# Patient Record
Sex: FEMALE | Race: WHITE | Employment: UNEMPLOYED | URBAN - NONMETROPOLITAN AREA
[De-identification: names, ages, dates, MRNs, and addresses within clinical notes are randomized per-mention and may not be internally consistent; named-entity substitution may affect disease eponyms.]

---

## 2017-08-03 ENCOUNTER — OFFICE VISIT (OUTPATIENT)
Dept: PRIMARY CARE CLINIC | Age: 27
End: 2017-08-03
Payer: MEDICAID

## 2017-08-03 VITALS
HEART RATE: 102 BPM | TEMPERATURE: 99.1 F | BODY MASS INDEX: 48.83 KG/M2 | OXYGEN SATURATION: 97 % | SYSTOLIC BLOOD PRESSURE: 136 MMHG | WEIGHT: 286 LBS | HEIGHT: 64 IN | DIASTOLIC BLOOD PRESSURE: 80 MMHG

## 2017-08-03 DIAGNOSIS — G95.0 SYRINX OF SPINAL CORD (HCC): ICD-10-CM

## 2017-08-03 DIAGNOSIS — G93.5 CHIARI I MALFORMATION (HCC): Primary | ICD-10-CM

## 2017-08-03 DIAGNOSIS — R29.898 RIGHT HAND WEAKNESS: ICD-10-CM

## 2017-08-03 PROCEDURE — 99203 OFFICE O/P NEW LOW 30 MIN: CPT | Performed by: NURSE PRACTITIONER

## 2017-08-03 RX ORDER — TIZANIDINE 4 MG/1
TABLET ORAL 3 TIMES DAILY
COMMUNITY
Start: 2017-07-25 | End: 2017-10-30 | Stop reason: DRUGHIGH

## 2017-08-03 RX ORDER — VENLAFAXINE HYDROCHLORIDE 150 MG/1
CAPSULE, EXTENDED RELEASE ORAL DAILY
COMMUNITY
Start: 2017-07-21 | End: 2017-08-29 | Stop reason: ALTCHOICE

## 2017-08-03 RX ORDER — MELOXICAM 15 MG/1
TABLET ORAL DAILY
COMMUNITY
Start: 2017-07-06 | End: 2017-09-28 | Stop reason: DRUGHIGH

## 2017-08-03 RX ORDER — HYDROCODONE BITARTRATE AND ACETAMINOPHEN 7.5; 325 MG/1; MG/1
1 TABLET ORAL EVERY 8 HOURS PRN
Qty: 30 TABLET | Refills: 0 | Status: SHIPPED | OUTPATIENT
Start: 2017-08-03 | End: 2017-08-29 | Stop reason: SDUPTHER

## 2017-08-03 RX ORDER — GABAPENTIN 300 MG/1
600 CAPSULE ORAL 3 TIMES DAILY
COMMUNITY
Start: 2017-07-05 | End: 2017-08-03 | Stop reason: SDUPTHER

## 2017-08-03 RX ORDER — GABAPENTIN 300 MG/1
600 CAPSULE ORAL 3 TIMES DAILY
Qty: 180 CAPSULE | Refills: 5 | Status: SHIPPED | OUTPATIENT
Start: 2017-08-03 | End: 2017-09-28 | Stop reason: DRUGHIGH

## 2017-08-03 ASSESSMENT — ENCOUNTER SYMPTOMS
COUGH: 0
VOICE CHANGE: 0
SINUS PRESSURE: 0
SHORTNESS OF BREATH: 0
EYE DISCHARGE: 0
EYE PAIN: 0
CHEST TIGHTNESS: 0
SORE THROAT: 0
DIARRHEA: 0
ABDOMINAL PAIN: 0
PHOTOPHOBIA: 0
VOMITING: 0
TROUBLE SWALLOWING: 0
WHEEZING: 0
BACK PAIN: 0
CONSTIPATION: 0
NAUSEA: 0
BLOOD IN STOOL: 0

## 2017-08-29 ENCOUNTER — OFFICE VISIT (OUTPATIENT)
Dept: PRIMARY CARE CLINIC | Age: 27
End: 2017-08-29
Payer: MEDICAID

## 2017-08-29 VITALS
TEMPERATURE: 97.2 F | OXYGEN SATURATION: 99 % | WEIGHT: 290 LBS | HEIGHT: 64 IN | SYSTOLIC BLOOD PRESSURE: 130 MMHG | HEART RATE: 98 BPM | BODY MASS INDEX: 49.51 KG/M2 | DIASTOLIC BLOOD PRESSURE: 84 MMHG

## 2017-08-29 DIAGNOSIS — G95.0 SYRINX OF SPINAL CORD (HCC): ICD-10-CM

## 2017-08-29 DIAGNOSIS — F32.9 REACTIVE DEPRESSION: ICD-10-CM

## 2017-08-29 DIAGNOSIS — G93.5 CHIARI I MALFORMATION (HCC): Primary | ICD-10-CM

## 2017-08-29 PROCEDURE — 99213 OFFICE O/P EST LOW 20 MIN: CPT | Performed by: NURSE PRACTITIONER

## 2017-08-29 RX ORDER — SERTRALINE HYDROCHLORIDE 100 MG/1
100 TABLET, FILM COATED ORAL DAILY
Qty: 30 TABLET | Refills: 11 | Status: ON HOLD | OUTPATIENT
Start: 2017-08-29 | End: 2018-08-06 | Stop reason: HOSPADM

## 2017-08-29 RX ORDER — GABAPENTIN 800 MG/1
800 TABLET ORAL 3 TIMES DAILY
Qty: 90 TABLET | Refills: 3 | Status: SHIPPED | OUTPATIENT
Start: 2017-08-29 | End: 2017-10-30 | Stop reason: SDUPTHER

## 2017-08-29 RX ORDER — HYDROCODONE BITARTRATE AND ACETAMINOPHEN 7.5; 325 MG/1; MG/1
1 TABLET ORAL EVERY 8 HOURS PRN
Qty: 30 TABLET | Refills: 0 | Status: SHIPPED | OUTPATIENT
Start: 2017-08-29 | End: 2017-09-28 | Stop reason: SDUPTHER

## 2017-08-29 ASSESSMENT — ENCOUNTER SYMPTOMS
TROUBLE SWALLOWING: 0
NAUSEA: 0
VOICE CHANGE: 0
DIARRHEA: 0
BLOOD IN STOOL: 0
COUGH: 0
WHEEZING: 0
ABDOMINAL PAIN: 0
EYE PAIN: 0
CHEST TIGHTNESS: 0
BACK PAIN: 0
SINUS PRESSURE: 0
SHORTNESS OF BREATH: 0
EYE DISCHARGE: 0
VOMITING: 0
SORE THROAT: 0
CONSTIPATION: 0
PHOTOPHOBIA: 0

## 2017-09-28 ENCOUNTER — OFFICE VISIT (OUTPATIENT)
Dept: PRIMARY CARE CLINIC | Age: 27
End: 2017-09-28
Payer: MEDICAID

## 2017-09-28 VITALS
BODY MASS INDEX: 49.64 KG/M2 | HEIGHT: 64 IN | SYSTOLIC BLOOD PRESSURE: 126 MMHG | TEMPERATURE: 98 F | OXYGEN SATURATION: 97 % | DIASTOLIC BLOOD PRESSURE: 82 MMHG | WEIGHT: 290.75 LBS | HEART RATE: 72 BPM

## 2017-09-28 DIAGNOSIS — G95.0 SYRINX OF SPINAL CORD (HCC): ICD-10-CM

## 2017-09-28 DIAGNOSIS — G93.5 CHIARI I MALFORMATION (HCC): Primary | ICD-10-CM

## 2017-09-28 DIAGNOSIS — G89.4 CHRONIC PAIN SYNDROME: ICD-10-CM

## 2017-09-28 PROCEDURE — 99213 OFFICE O/P EST LOW 20 MIN: CPT | Performed by: NURSE PRACTITIONER

## 2017-09-28 RX ORDER — DICLOFENAC SODIUM 75 MG/1
75 TABLET, DELAYED RELEASE ORAL 2 TIMES DAILY
Qty: 60 TABLET | Refills: 3 | Status: SHIPPED | OUTPATIENT
Start: 2017-09-28

## 2017-09-28 RX ORDER — HYDROCODONE BITARTRATE AND ACETAMINOPHEN 7.5; 325 MG/1; MG/1
1 TABLET ORAL EVERY 8 HOURS PRN
Qty: 30 TABLET | Refills: 0 | Status: SHIPPED | OUTPATIENT
Start: 2017-09-28 | End: 2017-10-30 | Stop reason: SDUPTHER

## 2017-09-28 ASSESSMENT — ENCOUNTER SYMPTOMS
ABDOMINAL PAIN: 0
NAUSEA: 0
VOMITING: 0
CONSTIPATION: 0
CHEST TIGHTNESS: 0
WHEEZING: 0
BLOOD IN STOOL: 0
COUGH: 0
EYE DISCHARGE: 0
BACK PAIN: 0
EYE PAIN: 0
SINUS PRESSURE: 0
PHOTOPHOBIA: 0
TROUBLE SWALLOWING: 0
SHORTNESS OF BREATH: 0
DIARRHEA: 0
SORE THROAT: 0
VOICE CHANGE: 0

## 2017-10-30 ENCOUNTER — OFFICE VISIT (OUTPATIENT)
Dept: PRIMARY CARE CLINIC | Age: 27
End: 2017-10-30
Payer: MEDICAID

## 2017-10-30 VITALS
DIASTOLIC BLOOD PRESSURE: 80 MMHG | HEIGHT: 64 IN | WEIGHT: 293 LBS | HEART RATE: 101 BPM | TEMPERATURE: 98.7 F | SYSTOLIC BLOOD PRESSURE: 138 MMHG | OXYGEN SATURATION: 98 % | BODY MASS INDEX: 50.02 KG/M2

## 2017-10-30 DIAGNOSIS — G93.5 CHIARI I MALFORMATION (HCC): Primary | ICD-10-CM

## 2017-10-30 DIAGNOSIS — G95.0 SYRINX OF SPINAL CORD (HCC): ICD-10-CM

## 2017-10-30 DIAGNOSIS — M62.838 MUSCLE SPASMS OF NECK: ICD-10-CM

## 2017-10-30 PROCEDURE — 99213 OFFICE O/P EST LOW 20 MIN: CPT | Performed by: NURSE PRACTITIONER

## 2017-10-30 RX ORDER — GABAPENTIN 800 MG/1
800 TABLET ORAL 3 TIMES DAILY
Qty: 90 TABLET | Refills: 3 | Status: SHIPPED | OUTPATIENT
Start: 2017-10-30 | End: 2017-12-29 | Stop reason: SDUPTHER

## 2017-10-30 RX ORDER — METHOCARBAMOL 500 MG/1
500 TABLET, FILM COATED ORAL 4 TIMES DAILY
Qty: 40 TABLET | Refills: 0 | Status: SHIPPED | OUTPATIENT
Start: 2017-10-30 | End: 2017-11-09

## 2017-10-30 RX ORDER — HYDROCODONE BITARTRATE AND ACETAMINOPHEN 7.5; 325 MG/1; MG/1
1 TABLET ORAL EVERY 8 HOURS PRN
Qty: 30 TABLET | Refills: 0 | Status: SHIPPED | OUTPATIENT
Start: 2017-10-30 | End: 2017-11-30 | Stop reason: SDUPTHER

## 2017-10-30 ASSESSMENT — ENCOUNTER SYMPTOMS
SORE THROAT: 0
WHEEZING: 0
SHORTNESS OF BREATH: 0
PHOTOPHOBIA: 0
CONSTIPATION: 0
ABDOMINAL PAIN: 0
COUGH: 0
BLOOD IN STOOL: 0
CHEST TIGHTNESS: 0
EYE DISCHARGE: 0
VOMITING: 0
BACK PAIN: 0
DIARRHEA: 0
VOICE CHANGE: 0
SINUS PRESSURE: 0
EYE PAIN: 0
TROUBLE SWALLOWING: 0
NAUSEA: 0

## 2017-10-30 NOTE — PATIENT INSTRUCTIONS
Patient Education        Neck Spasm: Care Instructions  Your Care Instructions  A neck spasm is sudden tightness and pain in your neck muscles. A spasm may be caused by some activities or repeated movements. For example, you may be more likely to have a neck spasm if you slouch, paint a ceiling, work at a computer, or sleep with your neck twisted. But the cause isn't always clear. Home treatment includes using heat or ice, taking over-the-counter (OTC) pain medicines, and avoiding activities that may lead to neck pain. Gentle stretching, or treatments such as massage or manipulation, may also help ease a neck spasm. For a neck spasm that doesn't get better with home care, your doctor may prescribe medicine. He or she may also suggest exercise or physical therapy to help strengthen or relax your neck muscles. Follow-up care is a key part of your treatment and safety. Be sure to make and go to all appointments, and call your doctor if you are having problems. It's also a good idea to know your test results and keep a list of the medicines you take. How can you care for yourself at home? · To relieve pain, use heat or ice (whichever feels better) on the affected area. ¨ Put a warm water bottle, a heating pad set on low, or a warm cloth on your neck. Put a thin cloth between the heating pad and your skin. Do not go to sleep with a heating pad on your skin. ¨ Try ice or a cold pack on the area for 10 to 20 minutes at a time. Put a thin cloth between the ice and your skin. · Ask your doctor if you can take acetaminophen (such as Tylenol) or nonsteroidal anti-inflammatory drugs, such as ibuprofen or naproxen. Your doctor can prescribe stronger medicines if needed. Be safe with medicines. Read and follow all instructions on the label. · Stretch your muscles every day, especially before and after exercise and at bedtime. Regular stretching can help relax your muscles.   · Try to find a pillow and a position in bed

## 2017-10-30 NOTE — PROGRESS NOTES
History:   Procedure Laterality Date    TONSILLECTOMY      TYMPANOSTOMY TUBE PLACEMENT         Social History   Substance Use Topics    Smoking status: Never Smoker    Smokeless tobacco: Never Used    Alcohol use No       Review of Systems   Constitutional: Negative for appetite change, chills, diaphoresis, fatigue and fever. HENT: Negative for congestion, ear discharge, ear pain, postnasal drip, sinus pressure, sore throat, trouble swallowing and voice change. Eyes: Negative for photophobia, pain, discharge and visual disturbance. Respiratory: Negative for cough, chest tightness, shortness of breath and wheezing. Cardiovascular: Negative for chest pain, palpitations and leg swelling. Gastrointestinal: Negative for abdominal pain, blood in stool, constipation, diarrhea, nausea and vomiting. Endocrine: Negative for polydipsia, polyphagia and polyuria. Genitourinary: Negative for difficulty urinating, dysuria, flank pain, frequency and menstrual problem. Musculoskeletal: Negative for arthralgias, back pain, joint swelling and myalgias. Skin: Negative for rash. Allergic/Immunologic: Negative for immunocompromised state. Neurological: Negative for dizziness (improving), seizures, syncope, weakness (right arm with numbess), light-headedness, numbness and headaches. Hematological: Negative for adenopathy. Does not bruise/bleed easily. Psychiatric/Behavioral: Negative for confusion, hallucinations, sleep disturbance and suicidal ideas. The patient is not nervous/anxious. Physical Exam   Constitutional: She is oriented to person, place, and time. She appears well-nourished. No distress. HENT:   Head: Normocephalic. Right Ear: External ear normal.   Left Ear: External ear normal.   Musculoskeletal:   Right arm weakness and diffuculty turning to left     Neurological: She is alert and oriented to person, place, and time. Skin: She is not diaphoretic.    Psychiatric:   Appears mild anxious     Vitals reviewed. ASSESSMENT/ PLAN    1. Chiari I malformation ([facility] [address]  Doing well   Pending surgery  - gabapentin (NEURONTIN) 800 MG tablet; Take 1 tablet by mouth 3 times daily  Dispense: 90 tablet; Refill: 3  - HYDROcodone-acetaminophen (NORCO) 7.5-325 MG per tablet; Take 1 tablet by mouth every 8 hours as needed for Pain . Dispense: 30 tablet; Refill: 0    2. Muscle spasms of neck  Cont present  - methocarbamol (ROBAXIN) 500 MG tablet; Take 1 tablet by mouth 4 times daily for 10 days  Dispense: 40 tablet; Refill: 0    3. Syrinx of spinal cord ([facility] [address]  Doing well  - gabapentin (NEURONTIN) 800 MG tablet; Take 1 tablet by mouth 3 times daily  Dispense: 90 tablet; Refill: 3  - HYDROcodone-acetaminophen (NORCO) 7.5-325 MG per tablet; Take 1 tablet by mouth every 8 hours as needed for Pain . Dispense: 30 tablet; Refill: 0      No orders of the defined types were placed in this encounter. Return in about 1 month (around 11/30/2017) for neck and head follow up. Patient Instructions     Patient Education        Neck Spasm: Care Instructions  Your Care Instructions  A neck spasm is sudden tightness and pain in your neck muscles. A spasm may be caused by some activities or repeated movements. For example, you may be more likely to have a neck spasm if you slouch, paint a ceiling, work at a computer, or sleep with your neck twisted. But the cause isn't always clear. Home treatment includes using heat or ice, taking over-the-counter (OTC) pain medicines, and avoiding activities that may lead to neck pain. Gentle stretching, or treatments such as massage or manipulation, may also help ease a neck spasm. For a neck spasm that doesn't get better with home care, your doctor may prescribe medicine. He or she may also suggest exercise or physical therapy to help strengthen or relax your neck muscles. Follow-up care is a key part of your treatment and safety.  Be sure to make and go to all

## 2017-11-30 ENCOUNTER — OFFICE VISIT (OUTPATIENT)
Dept: PRIMARY CARE CLINIC | Age: 27
End: 2017-11-30
Payer: MEDICAID

## 2017-11-30 VITALS
DIASTOLIC BLOOD PRESSURE: 88 MMHG | SYSTOLIC BLOOD PRESSURE: 120 MMHG | TEMPERATURE: 98.6 F | OXYGEN SATURATION: 98 % | HEIGHT: 64 IN | BODY MASS INDEX: 50.02 KG/M2 | HEART RATE: 97 BPM | WEIGHT: 293 LBS

## 2017-11-30 DIAGNOSIS — G93.5 CHIARI I MALFORMATION (HCC): Primary | ICD-10-CM

## 2017-11-30 DIAGNOSIS — G95.0 SYRINX OF SPINAL CORD (HCC): ICD-10-CM

## 2017-11-30 DIAGNOSIS — F32.89 OTHER DEPRESSION: ICD-10-CM

## 2017-11-30 DIAGNOSIS — R52 ACUTE PAIN: ICD-10-CM

## 2017-11-30 PROCEDURE — 99213 OFFICE O/P EST LOW 20 MIN: CPT | Performed by: NURSE PRACTITIONER

## 2017-11-30 RX ORDER — METHOCARBAMOL 500 MG/1
500 TABLET, FILM COATED ORAL 4 TIMES DAILY
Qty: 120 TABLET | Refills: 5 | Status: SHIPPED | OUTPATIENT
Start: 2017-11-30 | End: 2018-07-31

## 2017-11-30 RX ORDER — AMITRIPTYLINE HYDROCHLORIDE 25 MG/1
25 TABLET, FILM COATED ORAL NIGHTLY
Qty: 30 TABLET | Refills: 3 | Status: SHIPPED | OUTPATIENT
Start: 2017-11-30 | End: 2017-12-29 | Stop reason: SDUPTHER

## 2017-11-30 RX ORDER — METHOCARBAMOL 500 MG/1
500 TABLET, FILM COATED ORAL 4 TIMES DAILY
COMMUNITY
End: 2017-11-30 | Stop reason: SDUPTHER

## 2017-11-30 RX ORDER — HYDROCODONE BITARTRATE AND ACETAMINOPHEN 7.5; 325 MG/1; MG/1
1 TABLET ORAL EVERY 8 HOURS PRN
Qty: 30 TABLET | Refills: 0 | Status: SHIPPED | OUTPATIENT
Start: 2017-11-30 | End: 2017-12-29 | Stop reason: SDUPTHER

## 2017-11-30 ASSESSMENT — ENCOUNTER SYMPTOMS
DIARRHEA: 0
BACK PAIN: 0
SINUS PRESSURE: 0
PHOTOPHOBIA: 0
SORE THROAT: 0
COUGH: 0
EYE PAIN: 0
NAUSEA: 0
CHEST TIGHTNESS: 0
WHEEZING: 0
VOICE CHANGE: 0
BLOOD IN STOOL: 0
SHORTNESS OF BREATH: 0
CONSTIPATION: 0
TROUBLE SWALLOWING: 0
EYE DISCHARGE: 0
VOMITING: 0
ABDOMINAL PAIN: 0

## 2017-11-30 NOTE — PROGRESS NOTES
[address]  [place], [address] [place] [address] Rd  Phone [phone]   Fax [phone]      OFFICE VISIT: 2017    Heide Me- : 1990      Reason For Visit:  Nicholas Porter is a 33 y.o. female who is here for 1 Month Follow-Up (increased pain and anxiety)         Health Maintenance     HPI      Patient is here about pain and anxiety  Reports the last month feels like neurontin not working as well  She reports the pain is worse  Reports wears off before time to take another    Reports that the pain has been bothersome  Reports episodes or anxious and stressed out  Nothing in particular causing but wonders if it is the pain causing it    She reports she is needing a note to volunteer for food stamps  But at this time she feels loopy and cant drive  And waiting to see surgeon  Will see on the about treatment  She tries to not take the norco  But at this point getting worse           height is 5' 4\" (1.626 m) and weight is 299 lb (135.6 kg). Her temporal temperature is 98.6 °F (37 °C). Her blood pressure is 120/88 and her pulse is 97. Her oxygen saturation is 98%. Body mass index is 51.32 kg/m². No results found for this or any previous visit. I have reviewed the following with the MsKristie Brenda Campos   Lab Review   No results found for any previous visit. Copies of these are in the chart. Prior to Visit Medications    Medication Sig Taking? Authorizing Provider   methocarbamol (ROBAXIN) 500 MG tablet Take 1 tablet by mouth 4 times daily Yes SALVADOR Franks   amitriptyline (ELAVIL) 25 MG tablet Take 1 tablet by mouth nightly Yes SALVADOR Franks   HYDROcodone-acetaminophen (NORCO) 7.5-325 MG per tablet Take 1 tablet by mouth every 8 hours as needed for Pain .  Yes SALVADOR Franks   gabapentin (NEURONTIN) 800 MG tablet Take 1 tablet by mouth 3 times daily Yes SALVADOR Franks   diclofenac (VOLTAREN) 75 MG EC tablet Take 1 tablet by mouth 2 times daily Yes SALVADOR Franks tablet by mouth nightly  Dispense: 30 tablet; Refill: 3      No orders of the defined types were placed in this encounter. Return in about 1 month (around 12/30/2017) for neck and head follow up. There are no Patient Instructions on file for this visit. Controlled Substances Monitoring: Additional Instructions: As always, patient is advised to bring in medication bottles in order to correctly reconcile with our current list.      Sydney Brunner received counseling on the following healthy behaviors: plan of care    Patient given educational materials on diagnosis and plan    I have instructed Sydney Brunner to complete a self tracking handout on none and instructed them to bring it with them to her next appointment. Discussed use, benefit, and side effects of prescribed medications. Barriers to medication compliance addressed. All patient questions answered. Pt voiced understanding.      SALVADOR Molina

## 2017-12-29 ENCOUNTER — OFFICE VISIT (OUTPATIENT)
Dept: PRIMARY CARE CLINIC | Age: 27
End: 2017-12-29
Payer: MEDICAID

## 2017-12-29 VITALS
TEMPERATURE: 98 F | BODY MASS INDEX: 50.02 KG/M2 | HEIGHT: 64 IN | WEIGHT: 293 LBS | HEART RATE: 99 BPM | SYSTOLIC BLOOD PRESSURE: 130 MMHG | DIASTOLIC BLOOD PRESSURE: 80 MMHG | OXYGEN SATURATION: 98 %

## 2017-12-29 DIAGNOSIS — G95.0 SYRINX OF SPINAL CORD (HCC): ICD-10-CM

## 2017-12-29 DIAGNOSIS — G93.5 CHIARI I MALFORMATION (HCC): ICD-10-CM

## 2017-12-29 DIAGNOSIS — R52 ACUTE PAIN: ICD-10-CM

## 2017-12-29 DIAGNOSIS — F32.89 OTHER DEPRESSION: ICD-10-CM

## 2017-12-29 PROCEDURE — 99213 OFFICE O/P EST LOW 20 MIN: CPT | Performed by: NURSE PRACTITIONER

## 2017-12-29 RX ORDER — GABAPENTIN 800 MG/1
800 TABLET ORAL 3 TIMES DAILY
Qty: 90 TABLET | Refills: 3 | Status: SHIPPED | OUTPATIENT
Start: 2017-12-29 | End: 2018-04-28

## 2017-12-29 RX ORDER — AMITRIPTYLINE HYDROCHLORIDE 25 MG/1
25 TABLET, FILM COATED ORAL NIGHTLY
Qty: 30 TABLET | Refills: 3 | Status: ON HOLD | OUTPATIENT
Start: 2017-12-29 | End: 2018-08-06 | Stop reason: HOSPADM

## 2017-12-29 RX ORDER — HYDROCODONE BITARTRATE AND ACETAMINOPHEN 7.5; 325 MG/1; MG/1
1 TABLET ORAL EVERY 8 HOURS PRN
Qty: 60 TABLET | Refills: 0 | Status: SHIPPED | OUTPATIENT
Start: 2017-12-29 | End: 2018-01-29 | Stop reason: SDUPTHER

## 2017-12-29 ASSESSMENT — ENCOUNTER SYMPTOMS
EYE PAIN: 0
CHEST TIGHTNESS: 0
COUGH: 0
WHEEZING: 0
CONSTIPATION: 0
DIARRHEA: 0
SORE THROAT: 0
SINUS PRESSURE: 0
NAUSEA: 0
TROUBLE SWALLOWING: 0
VOICE CHANGE: 0
SHORTNESS OF BREATH: 0
BACK PAIN: 0
BLOOD IN STOOL: 0
PHOTOPHOBIA: 0
VOMITING: 0
ABDOMINAL PAIN: 0
EYE DISCHARGE: 0

## 2017-12-29 NOTE — PROGRESS NOTES
[address]  [place], [address]  Phone [phone]   Fax [phone]      OFFICE VISIT: 2017    Arpita Tobias- : 1990      Reason For Visit:  Peggy Berry is a 33 y.o. female who is here for 1 Month Follow-Up (neurosurgeon and syrinx follow up from )         Health Maintenance   HPI        Patient is here about the neurosurgeon  She was scheduled to go next month  But her mom fell and broke her hip so doesn't have to care for the patient   So she is here to follow up    She started the elavil  Reports it is helping   She is tired but her arm is getting worse  Sometimes to even eat it is worse  She has had to take the norco in the day instead of night  She reports her mom is still in the hospital  And so se has no where to take care of her  They are moving her to rehab  And may have to take care of her             height is 5' 4\" (1.626 m) and weight is 298 lb (135.2 kg). Her temporal temperature is 98 °F (36.7 °C). Her blood pressure is 130/80 and her pulse is 99. Her oxygen saturation is 98%. Body mass index is 51.15 kg/m². No results found for this or any previous visit. I have reviewed the following with the Ms. Denise James   Lab Review   No results found for any previous visit. Copies of these are in the chart. Prior to Visit Medications    Medication Sig Taking? Authorizing Provider   HYDROcodone-acetaminophen (NORCO) 7.5-325 MG per tablet Take 1 tablet by mouth every 8 hours as needed for Pain for up to 30 days. Yes Kenith Cushing, APRN   gabapentin (NEURONTIN) 800 MG tablet Take 1 tablet by mouth 3 times daily for 120 days.  Yes Kenith Cushing, APRN   amitriptyline (ELAVIL) 25 MG tablet Take 1 tablet by mouth nightly Yes Kenith Cushing, APRN   methocarbamol (ROBAXIN) 500 MG tablet Take 1 tablet by mouth 4 times daily Yes Kenith Cushing, APRN   diclofenac (VOLTAREN) 75 MG EC tablet Take 1 tablet by mouth 2 times daily Yes Kenith Cushing, APRN   sertraline (ZOLOFT) 100 MG tablet Take 1 tablet by mouth daily Yes Afsaneh Lan APRERASMO       Allergies: Pcn [penicillins]    Past Medical History:   Diagnosis Date    Chiari I malformation ([facility] [address]        Past Surgical History:   Procedure Laterality Date    TONSILLECTOMY      TYMPANOSTOMY TUBE PLACEMENT         Social History   Substance Use Topics    Smoking status: Never Smoker    Smokeless tobacco: Never Used    Alcohol use No       Review of Systems   Constitutional: Positive for activity change. Negative for appetite change, chills, diaphoresis, fatigue and fever. HENT: Negative for congestion, ear discharge, ear pain, postnasal drip, sinus pressure, sore throat, trouble swallowing and voice change. Eyes: Negative for photophobia, pain, discharge and visual disturbance. Respiratory: Negative for cough, chest tightness, shortness of breath and wheezing. Cardiovascular: Negative for chest pain, palpitations and leg swelling. Gastrointestinal: Negative for abdominal pain, blood in stool, constipation, diarrhea, nausea and vomiting. Endocrine: Negative for polydipsia, polyphagia and polyuria. Genitourinary: Negative for difficulty urinating, dysuria, flank pain, frequency and menstrual problem. Musculoskeletal: Positive for arthralgias, myalgias, neck pain and neck stiffness. Negative for back pain and joint swelling. Skin: Negative for rash. Allergic/Immunologic: Negative for immunocompromised state. Neurological: Positive for dizziness (improving) and headaches. Negative for seizures, syncope, weakness (right arm with numbess), light-headedness and numbness. Hematological: Negative for adenopathy. Does not bruise/bleed easily. Psychiatric/Behavioral: Positive for sleep disturbance (improved on elavil). Negative for confusion, hallucinations and suicidal ideas. The patient is nervous/anxious (improved on elavil).           Physical Exam   Constitutional: She is oriented to person, place, and time. She appears well-nourished. No distress. HENT:   Head: Normocephalic. Right Ear: External ear normal.   Left Ear: External ear normal.   Eyes: Right eye exhibits no discharge. Left eye exhibits no discharge. Cardiovascular: Normal rate, regular rhythm, normal heart sounds and intact distal pulses. No murmur heard. Pulmonary/Chest: Effort normal and breath sounds normal. No respiratory distress. She has no wheezes. Abdominal: Soft. Bowel sounds are normal.   Musculoskeletal: She exhibits tenderness (neck headache post). Right arm weakness and diffuculty turning to left     Lymphadenopathy:     She has no cervical adenopathy. Neurological: She is alert and oriented to person, place, and time. Skin: She is not diaphoretic. Psychiatric:   Appears mild anxious     Vitals reviewed. ASSESSMENT/ PLAN    1. Chiari I malformation ([facility] [address]  With family situation  Will need to move her surgery   And for her mom to help her  - HYDROcodone-acetaminophen (NORCO) 7.5-325 MG per tablet; Take 1 tablet by mouth every 8 hours as needed for Pain for up to 30 days. Dispense: 30 tablet; Refill: 0    2. Syrinx of spinal cord ([facility] [address]  Discussed lowest dose    - HYDROcodone-acetaminophen (NORCO) 7.5-325 MG per tablet; Take 1 tablet by mouth every 8 hours as needed for Pain for up to 30 days. Dispense: 30 tablet; Refill: 0    Will  Cont elavil  As helping monitor  Not able to drive she knows the risks  Lowest effective dose    No orders of the defined types were placed in this encounter. No Follow-up on file. There are no Patient Instructions on file for this visit. Controlled Substances Monitoring:     Attestation: The Prescription Monitoring Report for this patient was reviewed today.  SALVADOR Mcmillan)  Documentation: Possible medication side effects, risk of tolerance and/or dependence, and alternative treatments discussed., Obtaining appropriate analgesic effect of treatment., No signs of potential drug abuse or diversion identified. ([id number]) SALVADOR Castañeda)            Additional Instructions: As always, patient is advised to bring in medication bottles in order to correctly reconcile with our current list.      Celina Hwang received counseling on the following healthy behaviors: plan of care    Patient given educational materials on diagnosis and plan    I have instructed Celina Hwang to complete a self tracking handout on none and instructed them to bring it with them to her next appointment. Discussed use, benefit, and side effects of prescribed medications. Barriers to medication compliance addressed. All patient questions answered. Pt voiced understanding.      SALVADOR Castañeda

## 2018-01-29 ENCOUNTER — OFFICE VISIT (OUTPATIENT)
Dept: PRIMARY CARE CLINIC | Age: 28
End: 2018-01-29
Payer: MEDICAID

## 2018-01-29 VITALS
SYSTOLIC BLOOD PRESSURE: 130 MMHG | HEIGHT: 64 IN | DIASTOLIC BLOOD PRESSURE: 88 MMHG | HEART RATE: 85 BPM | BODY MASS INDEX: 50.02 KG/M2 | OXYGEN SATURATION: 98 % | TEMPERATURE: 98 F | WEIGHT: 293 LBS

## 2018-01-29 DIAGNOSIS — G93.5 CHIARI I MALFORMATION (HCC): ICD-10-CM

## 2018-01-29 DIAGNOSIS — G95.0 SYRINX OF SPINAL CORD (HCC): ICD-10-CM

## 2018-01-29 DIAGNOSIS — F32.4 MAJOR DEPRESSIVE DISORDER WITH SINGLE EPISODE, IN PARTIAL REMISSION (HCC): Primary | ICD-10-CM

## 2018-01-29 PROCEDURE — 99214 OFFICE O/P EST MOD 30 MIN: CPT | Performed by: NURSE PRACTITIONER

## 2018-01-29 RX ORDER — HYDROCODONE BITARTRATE AND ACETAMINOPHEN 7.5; 325 MG/1; MG/1
1 TABLET ORAL EVERY 8 HOURS PRN
Qty: 60 TABLET | Refills: 0 | Status: SHIPPED | OUTPATIENT
Start: 2018-01-29 | End: 2018-02-28

## 2018-01-29 ASSESSMENT — ENCOUNTER SYMPTOMS
SORE THROAT: 0
EYE PAIN: 0
NAUSEA: 0
CHEST TIGHTNESS: 0
BACK PAIN: 0
EYE DISCHARGE: 0
TROUBLE SWALLOWING: 0
VOICE CHANGE: 0
DIARRHEA: 0
CONSTIPATION: 0
COUGH: 0
WHEEZING: 0
ABDOMINAL PAIN: 0
PHOTOPHOBIA: 0
BLOOD IN STOOL: 0
SINUS PRESSURE: 0
SHORTNESS OF BREATH: 0
VOMITING: 0

## 2018-01-29 NOTE — PROGRESS NOTES
tablet by mouth 4 times daily Yes SALVADOR Stern   diclofenac (VOLTAREN) 75 MG EC tablet Take 1 tablet by mouth 2 times daily Yes SALVADOR Stern   sertraline (ZOLOFT) 100 MG tablet Take 1 tablet by mouth daily Yes SALVADOR Stern       Allergies: Pcn [penicillins]    Past Medical History:   Diagnosis Date    Chiari I malformation ([facility] [address]        Past Surgical History:   Procedure Laterality Date    TONSILLECTOMY      TYMPANOSTOMY TUBE PLACEMENT         Social History   Substance Use Topics    Smoking status: Never Smoker    Smokeless tobacco: Never Used    Alcohol use No       Review of Systems   Constitutional: Positive for activity change. Negative for appetite change, chills, diaphoresis, fatigue and fever. HENT: Negative for congestion, ear discharge, ear pain, postnasal drip, sinus pressure, sore throat, trouble swallowing and voice change. Eyes: Negative for photophobia, pain, discharge and visual disturbance. Respiratory: Negative for cough, chest tightness, shortness of breath and wheezing. Cardiovascular: Negative for chest pain, palpitations and leg swelling. Gastrointestinal: Negative for abdominal pain, blood in stool, constipation, diarrhea, nausea and vomiting. Endocrine: Negative for polydipsia, polyphagia and polyuria. Genitourinary: Negative for difficulty urinating, dysuria, flank pain, frequency and menstrual problem. Musculoskeletal: Positive for arthralgias, myalgias, neck pain and neck stiffness. Negative for back pain and joint swelling. Skin: Negative for rash. Allergic/Immunologic: Negative for immunocompromised state. Neurological: Positive for dizziness (improving) and headaches. Negative for seizures, syncope, weakness (right arm with numbess), light-headedness and numbness. Hematological: Negative for adenopathy. Does not bruise/bleed easily. Psychiatric/Behavioral: Positive for sleep disturbance (improved on elavil).  Negative for ? · You do not get better as expected. Where can you learn more? Go to [email or web address] org and sign in to your [employer] account. Enter [phone] in the [facility] box to learn more about \"Adjustment Disorder: Care Instructions. \"     If you do not have an account, please click on the \"Sign Up Now\" link. Current as of: March 5, 2017  Content Version: 11.5  © 6028-5875 [employer]. Care instructions adapted under license by [employer] [facility] ([facility]). If you have questions about a medical condition or this instruction, always ask your healthcare professional. Rogelio Ores any warranty or liability for your use of this information. Patient Education        Chronic Pain: Care Instructions  Your Care Instructions    Chronic pain is pain that lasts a long time (months or even years) and may or may not have a clear cause. It is different from acute pain, which usually does have a clear cause-like an injury or illness-and gets better over time. Chronic pain:  · Lasts over time but may vary from day to day. · Does not go away despite efforts to end it. · May disrupt your sleep and lead to fatigue. · May cause depression or anxiety. · May make your muscles tense, causing more pain. · Can disrupt your work, hobbies, home life, and relationships with friends and family. Chronic pain is a very real condition. It is not just in your head. Treatment can help and usually includes several methods used together, such as medicines, physical therapy, exercise, and other treatments. Learning how to relax and changing negative thought patterns can also help you cope. Chronic pain is complex. Taking an active role in your treatment will help you better manage your pain. Tell your doctor if you have trouble dealing with your pain. You may have to try several things before you find what works best for you. Follow-up care is a key part of your treatment and safety.  Be sure to make and go to all appointments, and call your doctor if you are having problems. It's also a good idea to know your test results and keep a list of the medicines you take. How can you care for yourself at home? · Pace yourself. Break up large jobs into smaller tasks. Save harder tasks for days when you have less pain, or go back and forth between hard tasks and easier ones. Take rest breaks. · Relax, and reduce stress. Relaxation techniques such as deep breathing or meditation can help. · Keep moving. Gentle, daily exercise can help reduce pain over the long run. Try low- or no-impact exercises such as walking, swimming, and stationary biking. Do stretches to stay flexible. · Try heat, cold packs, and massage. · Get enough sleep. Chronic pain can make you tired and drain your energy. Talk with your doctor if you have trouble sleeping because of pain. · Think positive. Your thoughts can affect your pain level. Do things that you enjoy to distract yourself when you have pain instead of focusing on the pain. See a movie, read a book, listen to music, or spend time with a friend. · If you think you are depressed, talk to your doctor about treatment. · Keep a daily pain diary. Record how your moods, thoughts, sleep patterns, activities, and medicine affect your pain. You may find that your pain is worse during or after certain activities or when you are feeling a certain emotion. Having a record of your pain can help you and your doctor find the best ways to treat your pain. · Take pain medicines exactly as directed. ¨ If the doctor gave you a prescription medicine for pain, take it as prescribed. ¨ If you are not taking a prescription pain medicine, ask your doctor if you can take an over-the-counter medicine. Reducing constipation caused by pain medicine  · Include fruits, vegetables, beans, and whole grains in your diet each day. These foods are high in fiber.   · Drink plenty of fluids, enough so that liability for your use of this information. Controlled Substances Monitoring:     Attestation: The Prescription Monitoring Report for this patient was reviewed today. SALVADOR Palma)  Documentation: Possible medication side effects, risk of tolerance and/or dependence, and alternative treatments discussed., Obtaining appropriate analgesic effect of treatment., No signs of potential drug abuse or diversion identified. ([id number]) SALVADOR Palma)  Acute Pain Prescriptions: Severe pain not adequately treated with lower dose. SALVADOR Palma)            Additional Instructions: As always, patient is advised to bring in medication bottles in order to correctly reconcile with our current list.      Araceli Caro received counseling on the following healthy behaviors: none    Patient given educational materials on plan of care    I have instructed Araceli Caro to complete a self tracking handout on none and instructed them to bring it with them to her next appointment. Discussed use, benefit, and side effects of prescribed medications. Barriers to medication compliance addressed. All patient questions answered. Pt voiced understanding.      SALVADOR Pinto

## 2018-02-05 ENCOUNTER — TELEPHONE (OUTPATIENT)
Dept: PRIMARY CARE CLINIC | Age: 28
End: 2018-02-05

## 2018-02-26 ENCOUNTER — TELEPHONE (OUTPATIENT)
Dept: PRIMARY CARE CLINIC | Age: 28
End: 2018-02-26

## 2018-07-31 ENCOUNTER — HOSPITAL ENCOUNTER (INPATIENT)
Age: 28
LOS: 6 days | Discharge: HOME | DRG: 885 | End: 2018-08-06
Attending: EMERGENCY MEDICINE | Admitting: PSYCHIATRY & NEUROLOGY
Payer: MEDICAID

## 2018-07-31 DIAGNOSIS — R45.851 SUICIDAL IDEATION: ICD-10-CM

## 2018-07-31 DIAGNOSIS — F32.A DEPRESSION, UNSPECIFIED DEPRESSION TYPE: Primary | ICD-10-CM

## 2018-07-31 LAB
ACETAMINOPHEN LEVEL: <15 UG/ML
ALBUMIN SERPL-MCNC: 4 G/DL (ref 3.5–5.2)
ALP BLD-CCNC: 117 U/L (ref 35–104)
ALT SERPL-CCNC: 26 U/L (ref 5–33)
AMPHETAMINE SCREEN, URINE: NEGATIVE
ANION GAP SERPL CALCULATED.3IONS-SCNC: 13 MMOL/L (ref 7–19)
AST SERPL-CCNC: 15 U/L (ref 5–32)
BARBITURATE SCREEN URINE: NEGATIVE
BASOPHILS ABSOLUTE: 0.1 K/UL (ref 0–0.2)
BASOPHILS RELATIVE PERCENT: 0.6 % (ref 0–1)
BENZODIAZEPINE SCREEN, URINE: POSITIVE
BILIRUB SERPL-MCNC: 0.6 MG/DL (ref 0.2–1.2)
BUN BLDV-MCNC: 6 MG/DL (ref 6–20)
CALCIUM SERPL-MCNC: 9.4 MG/DL (ref 8.6–10)
CANNABINOID SCREEN URINE: NEGATIVE
CHLORIDE BLD-SCNC: 102 MMOL/L (ref 98–111)
CO2: 25 MMOL/L (ref 22–29)
COCAINE METABOLITE SCREEN URINE: NEGATIVE
CREAT SERPL-MCNC: 0.6 MG/DL (ref 0.5–0.9)
EOSINOPHILS ABSOLUTE: 0.2 K/UL (ref 0–0.6)
EOSINOPHILS RELATIVE PERCENT: 1.8 % (ref 0–5)
ETHANOL: <10 MG/DL (ref 0–0.08)
GFR NON-AFRICAN AMERICAN: >60
GLUCOSE BLD-MCNC: 106 MG/DL (ref 74–109)
HCG QUALITATIVE: NEGATIVE
HCT VFR BLD CALC: 45.6 % (ref 37–47)
HEMOGLOBIN: 14 G/DL (ref 12–16)
LYMPHOCYTES ABSOLUTE: 2.2 K/UL (ref 1.1–4.5)
LYMPHOCYTES RELATIVE PERCENT: 26.3 % (ref 20–40)
Lab: ABNORMAL
MCH RBC QN AUTO: 26.7 PG (ref 27–31)
MCHC RBC AUTO-ENTMCNC: 30.7 G/DL (ref 33–37)
MCV RBC AUTO: 86.9 FL (ref 81–99)
MONOCYTES ABSOLUTE: 0.6 K/UL (ref 0–0.9)
MONOCYTES RELATIVE PERCENT: 6.9 % (ref 0–10)
NEUTROPHILS ABSOLUTE: 5.3 K/UL (ref 1.5–7.5)
NEUTROPHILS RELATIVE PERCENT: 64 % (ref 50–65)
OPIATE SCREEN URINE: NEGATIVE
PDW BLD-RTO: 14.8 % (ref 11.5–14.5)
PLATELET # BLD: 285 K/UL (ref 130–400)
PMV BLD AUTO: 11.7 FL (ref 9.4–12.3)
POTASSIUM SERPL-SCNC: 4.4 MMOL/L (ref 3.5–5)
RBC # BLD: 5.25 M/UL (ref 4.2–5.4)
SALICYLATE, SERUM: <3 MG/DL (ref 3–10)
SODIUM BLD-SCNC: 140 MMOL/L (ref 136–145)
TOTAL PROTEIN: 7.6 G/DL (ref 6.6–8.7)
WBC # BLD: 8.3 K/UL (ref 4.8–10.8)

## 2018-07-31 PROCEDURE — 80307 DRUG TEST PRSMV CHEM ANLYZR: CPT

## 2018-07-31 PROCEDURE — G0480 DRUG TEST DEF 1-7 CLASSES: HCPCS

## 2018-07-31 PROCEDURE — 80053 COMPREHEN METABOLIC PANEL: CPT

## 2018-07-31 PROCEDURE — 99285 EMERGENCY DEPT VISIT HI MDM: CPT | Performed by: EMERGENCY MEDICINE

## 2018-07-31 PROCEDURE — 84703 CHORIONIC GONADOTROPIN ASSAY: CPT

## 2018-07-31 PROCEDURE — 36415 COLL VENOUS BLD VENIPUNCTURE: CPT

## 2018-07-31 PROCEDURE — 85025 COMPLETE CBC W/AUTO DIFF WBC: CPT

## 2018-07-31 PROCEDURE — 99285 EMERGENCY DEPT VISIT HI MDM: CPT

## 2018-07-31 PROCEDURE — 1240000000 HC EMOTIONAL WELLNESS R&B

## 2018-07-31 RX ORDER — CLONAZEPAM 1 MG/1
1 TABLET ORAL 3 TIMES DAILY PRN
COMMUNITY

## 2018-07-31 RX ORDER — TIZANIDINE HYDROCHLORIDE 4 MG/1
4 CAPSULE, GELATIN COATED ORAL 3 TIMES DAILY
COMMUNITY

## 2018-07-31 RX ORDER — ACETAMINOPHEN 325 MG/1
650 TABLET ORAL EVERY 4 HOURS PRN
Status: DISCONTINUED | OUTPATIENT
Start: 2018-07-31 | End: 2018-08-06 | Stop reason: HOSPADM

## 2018-07-31 RX ORDER — BUSPIRONE HYDROCHLORIDE 5 MG/1
5 TABLET ORAL 3 TIMES DAILY
Status: ON HOLD | COMMUNITY
End: 2018-08-06 | Stop reason: HOSPADM

## 2018-07-31 ASSESSMENT — ENCOUNTER SYMPTOMS
SHORTNESS OF BREATH: 0
SORE THROAT: 0
COUGH: 0
NAUSEA: 0
ABDOMINAL PAIN: 0
VOMITING: 0
DIARRHEA: 0
BACK PAIN: 0
RHINORRHEA: 0

## 2018-07-31 ASSESSMENT — PATIENT HEALTH QUESTIONNAIRE - PHQ9: SUM OF ALL RESPONSES TO PHQ QUESTIONS 1-9: 14

## 2018-07-31 ASSESSMENT — LIFESTYLE VARIABLES: HISTORY_ALCOHOL_USE: NO

## 2018-07-31 ASSESSMENT — SLEEP AND FATIGUE QUESTIONNAIRES
AVERAGE NUMBER OF SLEEP HOURS: 12
DO YOU USE A SLEEP AID: NO
DO YOU HAVE DIFFICULTY SLEEPING: NO

## 2018-07-31 NOTE — ED PROVIDER NOTES
(ELAVIL) 25 MG TABLET    Take 1 tablet by mouth nightly    BUSPIRONE (BUSPAR) 5 MG TABLET    Take 5 mg by mouth 3 times daily    CLONAZEPAM (KLONOPIN) 1 MG TABLET    Take 1 mg by mouth 2 times daily as needed. Kali Counter DICLOFENAC (VOLTAREN) 75 MG EC TABLET    Take 1 tablet by mouth 2 times daily    GABAPENTIN (NEURONTIN) 800 MG TABLET    Take 1 tablet by mouth 3 times daily for 120 days. SERTRALINE (ZOLOFT) 100 MG TABLET    Take 1 tablet by mouth daily    TIZANIDINE (ZANAFLEX) 4 MG CAPSULE    Take 4 mg by mouth 3 times daily       ALLERGIES     Pcn [penicillins]    FAMILY HISTORY     History reviewed. No pertinent family history. SOCIAL HISTORY       Social History     Social History    Marital status: Single     Spouse name: N/A    Number of children: N/A    Years of education: N/A     Social History Main Topics    Smoking status: Never Smoker    Smokeless tobacco: Never Used    Alcohol use No    Drug use: No    Sexual activity: Not Asked     Other Topics Concern    None     Social History Narrative    None       SCREENINGS             PHYSICAL EXAM    (up to 7 for level 4, 8 or more for level 5)     ED Triage Vitals [07/31/18 1618]   BP Temp Temp Source Pulse Resp SpO2 Height Weight   114/70 97.8 °F (36.6 °C) Temporal 85 18 94 % 5' 4\" (1.626 m) 275 lb (124.7 kg)       Physical Exam   Constitutional: She is oriented to person, place, and time. She appears well-developed and well-nourished. No distress. Obese   HENT:   Head: Normocephalic and atraumatic. Right Ear: External ear normal.   Left Ear: External ear normal.   Eyes: Conjunctivae and EOM are normal.   Neck: Normal range of motion. No tracheal deviation present. Cardiovascular: Normal rate, regular rhythm, normal heart sounds and intact distal pulses. No murmur heard. Pulmonary/Chest: Breath sounds normal. No respiratory distress. She has no wheezes. She has no rales. Abdominal: Soft. There is no tenderness.    Musculoskeletal:
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
## 2018-08-01 PROCEDURE — 6370000000 HC RX 637 (ALT 250 FOR IP): Performed by: PSYCHIATRY & NEUROLOGY

## 2018-08-01 PROCEDURE — 6370000000 HC RX 637 (ALT 250 FOR IP): Performed by: FAMILY MEDICINE

## 2018-08-01 PROCEDURE — 1240000000 HC EMOTIONAL WELLNESS R&B

## 2018-08-01 RX ORDER — GABAPENTIN 400 MG/1
800 CAPSULE ORAL 3 TIMES DAILY
Status: DISCONTINUED | OUTPATIENT
Start: 2018-08-01 | End: 2018-08-06 | Stop reason: HOSPADM

## 2018-08-01 RX ORDER — TIZANIDINE 2 MG/1
2 TABLET ORAL EVERY 8 HOURS PRN
Status: DISCONTINUED | OUTPATIENT
Start: 2018-08-01 | End: 2018-08-04

## 2018-08-01 RX ADMIN — TIZANIDINE 2 MG: 2 TABLET ORAL at 21:21

## 2018-08-01 RX ADMIN — GABAPENTIN 800 MG: 400 CAPSULE ORAL at 21:21

## 2018-08-01 RX ADMIN — SERTRALINE HYDROCHLORIDE 150 MG: 100 TABLET ORAL at 13:58

## 2018-08-01 RX ADMIN — GABAPENTIN 800 MG: 400 CAPSULE ORAL at 14:31

## 2018-08-01 NOTE — PROGRESS NOTES
Patient is alert and oriented to person time place and situation. Patient is cooperative with care and medication. Patient is social and attends groups. Patient at this time denies SI, HI & AVH.  Electronically signed by Delvin Fischer RN on 8/1/2018 at 1:15 PM

## 2018-08-01 NOTE — PROGRESS NOTES
Group Therapy Note    Start Time: 289  End Time:  930  Number of Participants: 17    Type of Group: Community Meeting       Patient's Goal:  \"dealing with depression positively\"      Notes:      Participation Level:  Active Listener       Participation Quality: Appropriate      Thought Process/Content: Logical      Affective Functioning: Congruent      Mood: calm      Level of consciousness:  Alert      Modes of Intervention: Support      Discipline Responsible: Behavioral Health Tech II      Signature:  Luanne Collazo

## 2018-08-01 NOTE — PROGRESS NOTES
SW placed call to get collateral from patients mom Vickie Brown and left a voice mail at [phone]

## 2018-08-01 NOTE — PLAN OF CARE
Problem: Altered Mood, Depressive Behavior:  Goal: Able to verbalize and/or display a decrease in depressive symptoms  Able to verbalize and/or display a decrease in depressive symptoms   Outcome: Ongoing   Group Therapy Note     Date: 8/1/2018  Start Time: 1100  End Time:  3686  Number of Participants: 12     Type of Group: Psychoeducation     Wellness Binder Information  Module Name:  Staying Well   Session Number:  1     Patient's Goal:  Daily maintenance and coping skills      Notes: Pt participated in group as scheduled. Pt discussed 25 Ways of Relieving Anxiety and everyday stressors         Status After Intervention:  Improved     Participation Level: Active Listener and Interactive     Participation Quality: Appropriate, Attentive and Sharing        Speech:  normal        Thought Process/Content: Logical        Affective Functioning: Congruent        Mood: congruent         Level of consciousness:  Alert and Oriented x4        Response to Learning: Able to verbalize current knowledge/experience, Able to verbalize/acknowledge new learning and Able to retain information        Endings: None Reported     Modes of Intervention: Education, Support and Socialization        Discipline Responsible: Psychoeducational Specialist        Signature:   Snow Womack

## 2018-08-01 NOTE — PLAN OF CARE
Problem: Altered Mood, Depressive Behavior:  Goal: Able to verbalize acceptance of life and situations over which he or she has no control  Able to verbalize acceptance of life and situations over which he or she has no control   Outcome: Ongoing    Goal: Able to verbalize and/or display a decrease in depressive symptoms  Able to verbalize and/or display a decrease in depressive symptoms   Outcome: Ongoing    Goal: Ability to disclose and discuss suicidal ideas will improve  Ability to disclose and discuss suicidal ideas will improve   Outcome: Ongoing    Goal: Able to verbalize support systems  Able to verbalize support systems   Outcome: Ongoing    Goal: Absence of self-harm  Absence of self-harm   Outcome: Ongoing    Goal: Patient specific goal  Patient specific goal   Outcome: Ongoing    Goal: Participates in care planning  Participates in care planning   Outcome: Ongoing      Problem: Health Behavior:  Goal: Ability to verbalize adaptive coping strategies to use when suicidal feelings occur will improve  Ability to verbalize adaptive coping strategies to use when suicidal feelings occur will improve   Outcome: Ongoing    Goal: Ability to verbalize adaptive coping strategies to use when the urge to self-mutilate occurs will improve  Ability to verbalize adaptive coping strategies to use when the urge to self-mutilate occurs will improve   Outcome: Ongoing

## 2018-08-01 NOTE — PROGRESS NOTES
Nutrition Assessment    Type and Reason for Visit: Initial, Positive Nutrition Screen    Malnutrition Assessment:  · Malnutrition Status: No malnutrition    Nutrition Diagnosis:   · Problem: No nutrition diagnosis at this time    Nutrition Assessment:  · Subjective Assessment: MST 2. Pt is on a General diet. Appetite/PO intake is good. Pt is consuming % of meals. Labs currently indicate adequate nutritional status. Continue current POC at this time. Nutrition Risk Level   Risk Level: Low    Nutrition Intervention  Food and/or Delivery: Continue current diet  Nutrition Education/Counseling/Coordination of Care:  Continued Inpatient Monitoring    Patient assessed for nutrition risk. Deemed to be at low risk at this time. Will continue to follow patient.       Electronically signed by Alvina Simms RD, LD on 8/1/18 at 10:16 AM    Contact Number: [phone]

## 2018-08-01 NOTE — PROGRESS NOTES
BHI Admission From ED  Nursing Admission Note    Admission Type: Voluntary    Reason for Admission: depression and self harm    Patient Active Problem List   Diagnosis    Chiari I malformation ([facility] [place] [address]    Syrinx of spinal cord ([facility] [place] [address]       Pt admitted from Dr. Karen Rodríguez care in ED to Adult [facility] room # 605-1. Arrived on unit via [facility] with security and er tech escort. Pt appropriately attired in hospital paper scrubs. Body assessment completed by JERONIMO Sweeney and VERITO De Los Santos with no contraband discovered. All tubes, lines, and drains were appropriately discontinued by ED staff prior to pt transfer to [facility]. Pt belongings and valuables inventoried and cataloged, stored per policy. Pt oriented to surroundings, program expectations, and copy pt rights given. Received admit packet: [facility], Visitation Info, Fall Prevention, Restraints Info. Consents reviewed, signed Pt Rights, Handbook Acceptance, Visit/Call Acceptance, PHI Release, Social Info Release, and Treatment Agreement. Pt verbalizes understanding. Identifies stressors. nothing. Status and Exam  Normal: No  Facial Expression: Sad  Affect: Appropriate  Level of Consciousness: Alert  Mood:Normal: No  Mood: Depressed, Sad, Anxious  Motor Activity:Normal: No  Motor Activity: Decreased  Interview Behavior: Cooperative  Preception: [place] to Person, [place] to Time, [place] to Place, [place] to Situation  Attention:Normal: Yes  Thought Processes: Blocking  Thought Content:Normal: No  Thought Content: Poverty of Content  Hallucinations: None  Delusions: No  Memory:Normal: Yes  Insight and Judgment: No  Insight and Judgment: Poor Insight, Poor Judgment  Present Suicidal Ideation: No  Present Homicidal Ideation: No     Patient rates depression at 9 and anxiety at 7 and denies SI, HI or AVH. Patient has multiple bruises in different stages of healing to bilateral legs and arms from hitting self with belt buckle. Patient with no obvious s/s of distress voiced or noted .  Will continue to monitor.      Chelsie Mayers RN

## 2018-08-02 LAB
TSH SERPL DL<=0.05 MIU/L-ACNC: 2.3 UIU/ML (ref 0.27–4.2)
VITAMIN B-12: 550 PG/ML (ref 211–946)
VITAMIN D 25-HYDROXY: 12.8 NG/ML

## 2018-08-02 PROCEDURE — 84443 ASSAY THYROID STIM HORMONE: CPT

## 2018-08-02 PROCEDURE — 1240000000 HC EMOTIONAL WELLNESS R&B

## 2018-08-02 PROCEDURE — 6370000000 HC RX 637 (ALT 250 FOR IP): Performed by: FAMILY MEDICINE

## 2018-08-02 PROCEDURE — 82306 VITAMIN D 25 HYDROXY: CPT

## 2018-08-02 PROCEDURE — 6370000000 HC RX 637 (ALT 250 FOR IP): Performed by: PSYCHIATRY & NEUROLOGY

## 2018-08-02 PROCEDURE — 82607 VITAMIN B-12: CPT

## 2018-08-02 PROCEDURE — 36415 COLL VENOUS BLD VENIPUNCTURE: CPT

## 2018-08-02 PROCEDURE — 99232 SBSQ HOSP IP/OBS MODERATE 35: CPT | Performed by: PSYCHIATRY & NEUROLOGY

## 2018-08-02 RX ORDER — CETIRIZINE HYDROCHLORIDE 10 MG/1
5 TABLET ORAL DAILY
Status: DISCONTINUED | OUTPATIENT
Start: 2018-08-02 | End: 2018-08-06 | Stop reason: HOSPADM

## 2018-08-02 RX ORDER — ERGOCALCIFEROL 1.25 MG/1
50000 CAPSULE ORAL WEEKLY
Status: DISCONTINUED | OUTPATIENT
Start: 2018-08-02 | End: 2018-08-06 | Stop reason: HOSPADM

## 2018-08-02 RX ORDER — ERGOCALCIFEROL (VITAMIN D2) 1250 MCG
50000 CAPSULE ORAL WEEKLY
Qty: 11 CAPSULE | Refills: 0 | Status: SHIPPED | OUTPATIENT
Start: 2018-08-02 | End: 2018-08-06

## 2018-08-02 RX ADMIN — TIZANIDINE 2 MG: 2 TABLET ORAL at 08:24

## 2018-08-02 RX ADMIN — SERTRALINE HYDROCHLORIDE 150 MG: 100 TABLET ORAL at 08:24

## 2018-08-02 RX ADMIN — GABAPENTIN 800 MG: 400 CAPSULE ORAL at 08:25

## 2018-08-02 RX ADMIN — TIZANIDINE 2 MG: 2 TABLET ORAL at 16:41

## 2018-08-02 RX ADMIN — GABAPENTIN 800 MG: 400 CAPSULE ORAL at 20:40

## 2018-08-02 RX ADMIN — CETIRIZINE HYDROCHLORIDE 5 MG: 10 TABLET ORAL at 16:41

## 2018-08-02 RX ADMIN — GABAPENTIN 800 MG: 400 CAPSULE ORAL at 13:26

## 2018-08-02 RX ADMIN — ERGOCALCIFEROL 50000 UNITS: 1.25 CAPSULE ORAL at 18:13

## 2018-08-02 NOTE — H&P
HISTORY and PHYSICAL      CHIEF COMPLAINT:  Depression    Reason for Admission:  Depression    History Obtained From:  patient    HISTORY OF PRESENT ILLNESS:      The patient is a 33 y.o. female who is admitted to the [place] [address] unit with worsening mood issues. She has no c/o CP or SOA. No abdominal pain or N/V. No dysuria. No HA or dizziness. No new pain complaints. No recent fevers. Past Medical History:        Diagnosis Date    Chiari I malformation [facility])      Past Surgical History:        Procedure Laterality Date    BRAIN SURGERY  03/23/2018    chiari decompression surgery    TONSILLECTOMY      TYMPANOSTOMY TUBE PLACEMENT           Medications Prior to Admission:    Prescriptions Prior to Admission: tiZANidine (ZANAFLEX) 4 MG capsule, Take 4 mg by mouth 3 times daily  clonazePAM (KLONOPIN) 1 MG tablet, Take 1 mg by mouth 3 times daily as needed. .  busPIRone (BUSPAR) 5 MG tablet, Take 5 mg by mouth 3 times daily  gabapentin (NEURONTIN) 800 MG tablet, Take 1 tablet by mouth 3 times daily for 120 days. amitriptyline (ELAVIL) 25 MG tablet, Take 1 tablet by mouth nightly  diclofenac (VOLTAREN) 75 MG EC tablet, Take 1 tablet by mouth 2 times daily  sertraline (ZOLOFT) 100 MG tablet, Take 1 tablet by mouth daily (Patient taking differently: Take 125 mg by mouth daily )    Allergies:  Pcn [penicillins]    Social History:   TOBACCO:   reports that she has never smoked. She has never used smokeless tobacco.  ETOH:   reports that she does not drink alcohol. DRUGS:   reports that she does not use drugs. MARITAL STATUS:  Not   OCCUPATION:  Not working  Patient currently lives with family       Family History:   History reviewed. No pertinent family history.   REVIEW OF SYSTEMS:  Constitutional: neg  CV: neg  Pulmonary: neg  GI: neg  : neg  Psych: depression  Neuro: neg  Skin: neg  MusculoSkeletal: generalized pain  HEENT: neg  Joints:

## 2018-08-02 NOTE — PROGRESS NOTES
Group Therapy Note     Date: 8/2/2018  Start Time: 1600  End Time:  1630  Number of Participants: 11     Type of Group: Healthy Living/Wellness        Patient's Goal: focus on the positive     Notes: things I like are odilon, music, polish talking, talking to polish people online, podcasts, [employer], [employer] languages  People I like are my mom, sister, dad, Ali Roque and isabell, the people here, my niece and nephew and my granny.     Status After Intervention:  Improved     Participation Level: Interactive     Participation Quality: Appropriate and Attentive        Speech:  normal        Thought Process/Content: Linear and logical        Affective Functioning: Congruent and Flat        Mood: depressed        Level of consciousness:  Oriented x4        Response to Learning: Able to verbalize current knowledge/experience, Able to verbalize/acknowledge new learning, Able to retain information, Capable of insight and Progressing to goal        Endings: None Reported     Modes of Intervention: Exploration        Discipline Responsible: Registered Nurse        Signature:  Jazzy Boswell RN

## 2018-08-02 NOTE — PROGRESS NOTES
BHI Daily Shift Assessment  Nursing Progress Note    Room: [facility]/605-01 Name: Leandra Lindsey Age: 33 y.o. Ethnicity:  Gender: female   Dx: <principal problem not specified>  Precautions: suicide risk  CPAP: No Accu-Chek: No  Sleep: Yes, Good, no sleep issues Hours Slept: 8 Sched Sleep Meds: No PRN Sleep Meds: No Other PRN Meds: Yes Med Compliant: Yes Appetite: good Percent Meals: 100% Social: Yes ADLs: Yes Speech: normal Depression: 0 Anxiety: 4   Pleasant, calm, and cooperative. Affect is flat and congruent. Mood is anxious. Appearance is disheveled. Good eye contact and is attentive during interview. Socializing with peers and attending groups.     Issa Gonzalez RN

## 2018-08-02 NOTE — PLAN OF CARE
Problem: Altered Mood, Depressive Behavior:  Goal: Able to verbalize acceptance of life and situations over which he or she has no control  Able to verbalize acceptance of life and situations over which he or she has no control   Outcome: Ongoing                                                                      Group Therapy Note    Date: 8/2/2018  Start Time: 1000  End Time:  1100  Number of Participants: 15    Type of Group: Psychoeducation    Wellness Binder Information  Module Name:  Women's Issues  Session Number:  4    Patient's Goal:  To gain a better understanding of how thoughts influence feelings    Notes:  Pt demonstrated improved understanding of how thoughts influence feelings by actively participating in group discussion.     Status After Intervention:  Unchanged    Participation Level: Interactive    Participation Quality: Attentive      Speech:  normal      Thought Process/Content: Logical      Affective Functioning: Congruent      Mood: anxious and depressed      Level of consciousness:  Alert and Oriented x4      Response to Learning: Able to verbalize current knowledge/experience, Able to verbalize/acknowledge new learning and Progressing to goal      Endings: None Reported    Modes of Intervention: Education      Discipline Responsible: Psychoeducational Specialist      Signature:  Andrea Ugarte

## 2018-08-02 NOTE — PROGRESS NOTES
Morning Group/ Community Meeting    Date: 8/2/18  Time: 0900    No. of Participants: 15    Patient attended and participated in morning group. Patient Goal: dealing with anxiety and depression.     [address] [place] Technician
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
## 2018-08-02 NOTE — PROGRESS NOTES
Patient is calm and cooperative with staff and peers. Patient has fair appetite, some what social, participated in group, med compliant, and did not perform ADLs. Patient rated depression 4/10, anxiety 6/10, and denies SI, HI, and AVH. Will continue to monitor.

## 2018-08-03 PROCEDURE — 99222 1ST HOSP IP/OBS MODERATE 55: CPT | Performed by: PSYCHIATRY & NEUROLOGY

## 2018-08-03 PROCEDURE — 1240000000 HC EMOTIONAL WELLNESS R&B

## 2018-08-03 PROCEDURE — 6370000000 HC RX 637 (ALT 250 FOR IP): Performed by: FAMILY MEDICINE

## 2018-08-03 PROCEDURE — 99232 SBSQ HOSP IP/OBS MODERATE 35: CPT | Performed by: PSYCHIATRY & NEUROLOGY

## 2018-08-03 PROCEDURE — 6370000000 HC RX 637 (ALT 250 FOR IP): Performed by: PSYCHIATRY & NEUROLOGY

## 2018-08-03 RX ADMIN — CETIRIZINE HYDROCHLORIDE 5 MG: 10 TABLET ORAL at 08:30

## 2018-08-03 RX ADMIN — GABAPENTIN 800 MG: 400 CAPSULE ORAL at 15:18

## 2018-08-03 RX ADMIN — TIZANIDINE 2 MG: 2 TABLET ORAL at 17:11

## 2018-08-03 RX ADMIN — TIZANIDINE 2 MG: 2 TABLET ORAL at 09:12

## 2018-08-03 RX ADMIN — GABAPENTIN 800 MG: 400 CAPSULE ORAL at 21:02

## 2018-08-03 RX ADMIN — GABAPENTIN 800 MG: 400 CAPSULE ORAL at 08:30

## 2018-08-03 RX ADMIN — TIZANIDINE 2 MG: 2 TABLET ORAL at 00:59

## 2018-08-03 RX ADMIN — SERTRALINE HYDROCHLORIDE 150 MG: 100 TABLET ORAL at 08:31

## 2018-08-03 NOTE — PROGRESS NOTES
Db Baltazar 1990                         Chief Complaint   Patient presents with    Mental Health Problem    Depression         Subjective:  I feel much better and my sleep and appetite also get better and I am not suicidal. My medications help me and I have no side effects. Patient reports side effects as follows: none. Reports No suicidal ideation. Reports compliance with medications as good .      Review of Systems - Negative except right neck and arm pain and muscle spasm during the night.     Current Meds:     Home Medications           Prior to Admission medications    Medication Sig Start Date End Date Taking? Authorizing Provider   vitamin D (ERGOCALCIFEROL) 69816 units capsule Take 1 capsule by mouth once a week for 11 doses 8/2/18 10/12/18 Yes Sheeba Rosales MD   tiZANidine (ZANAFLEX) 4 MG capsule Take 4 mg by mouth 3 times daily     Yes Historical Provider, MD   clonazePAM (KLONOPIN) 1 MG tablet Take 1 mg by mouth 3 times daily as needed. .     Yes Historical Provider, MD   busPIRone (BUSPAR) 5 MG tablet Take 5 mg by mouth 3 times daily     Yes Historical Provider, MD   gabapentin (NEURONTIN) 800 MG tablet Take 1 tablet by mouth 3 times daily for 120 days. 12/29/17 4/28/18   SALVADOR Zaragoza   amitriptyline (ELAVIL) 25 MG tablet Take 1 tablet by mouth nightly 12/29/17     SALVADOR Zaragoza   diclofenac (VOLTAREN) 75 MG EC tablet Take 1 tablet by mouth 2 times daily 9/28/17     SALVADOR Zaragoza   sertraline (ZOLOFT) 100 MG tablet Take 1 tablet by mouth daily  Patient taking differently: Take 125 mg by mouth daily  8/29/17     SALVADOR Zaragoza         Zoloft 150 mg QHS and Neurontin 800 mh tid  @     MSE:  Patient is  A & O x3. Appearance:  street clothes, in chair, fair grooming and fair hygiene  Cognition:  Recent memory intact , remote memory intact , fair fund of knowledge, below average intelligence level. Speech:  normal  Language: Naming: intact;  Word Finding: intact  Conversation no evidence of delusions  Behavior:  Uncooperative and Poor eye contact  Mood: depressed and anxious  Affect: congruent with mood  Thought Content: negative for SI/HI/AH/VH delusions, hallucinations, obsessions, homicidal and suicidal  Thought Process: linear and goal directed  Judgement Insight:  diminished and inappropriate  Gait and Station:normal gait and station   Musculoskeletal:     ASSESSMENT:  1.  Major depressive disorder, single episode, moderate, without psychotic       features. 2.  Persistent depressive disorder. 3.  Panic attack, rule out panic disorder without agoraphobia. 4.  Chiari I malformation.     PLAN:  The patient is an imminent danger to herself, therefore inpatient  level of care is needed, keep the patient on 15-minute checks for safety. Psychiatric education regarding major depressive disorder, persistent  depressive disorder and treatment options.   Consider medical consultation  if any acute medical issues arise.  The patient will also be provided with  individual therapy, group therapy, substance abuse counseling, and  gains-added coping skills.  The patient will be assessed on a daily basis  for her depression and suicide ideation.  We will keep her home  medication, Zoloft 150 mg daily for her depression and anxiety. Continue the patient/s home medication Neurontin 800 mg three times a day.    The patient will be on Klonopin if her panic attack returns.  The  patient will be discharged back to her outpatient mental health provider  upon stabilization.  This is an voluntary admission.  Expected length of  stay is 3-4 days.

## 2018-08-03 NOTE — PROGRESS NOTES
Group Therapy Note    Date:8/2/18  Time:2030    Patient attended and participated in [facility]. Patient filled out wrap up group documentation.     Notes:    Ryley MA

## 2018-08-04 PROCEDURE — 99231 SBSQ HOSP IP/OBS SF/LOW 25: CPT | Performed by: PSYCHIATRY & NEUROLOGY

## 2018-08-04 PROCEDURE — 6370000000 HC RX 637 (ALT 250 FOR IP): Performed by: FAMILY MEDICINE

## 2018-08-04 PROCEDURE — 1240000000 HC EMOTIONAL WELLNESS R&B

## 2018-08-04 PROCEDURE — 6370000000 HC RX 637 (ALT 250 FOR IP): Performed by: PSYCHIATRY & NEUROLOGY

## 2018-08-04 RX ORDER — TIZANIDINE 4 MG/1
4 TABLET ORAL EVERY 8 HOURS PRN
Status: DISCONTINUED | OUTPATIENT
Start: 2018-08-04 | End: 2018-08-06 | Stop reason: HOSPADM

## 2018-08-04 RX ADMIN — TIZANIDINE 2 MG: 2 TABLET ORAL at 09:24

## 2018-08-04 RX ADMIN — GABAPENTIN 800 MG: 400 CAPSULE ORAL at 21:22

## 2018-08-04 RX ADMIN — GABAPENTIN 800 MG: 400 CAPSULE ORAL at 14:06

## 2018-08-04 RX ADMIN — TIZANIDINE 2 MG: 2 TABLET ORAL at 01:02

## 2018-08-04 RX ADMIN — SERTRALINE HYDROCHLORIDE 150 MG: 100 TABLET ORAL at 08:34

## 2018-08-04 RX ADMIN — GABAPENTIN 800 MG: 400 CAPSULE ORAL at 08:33

## 2018-08-04 RX ADMIN — CETIRIZINE HYDROCHLORIDE 5 MG: 10 TABLET ORAL at 08:33

## 2018-08-04 RX ADMIN — TIZANIDINE 4 MG: 4 TABLET ORAL at 17:42

## 2018-08-04 NOTE — BH NOTE
Pt assessed 1:1. Pt A&O. Pt rates depression 4/10 & anxiety 6/10; with 10 being the highest. Pt completed ADL's. Pt c/o requested PRN Zanaflex. Pt reports sleep as \"pretty good\" & appetite WNL. Pt is compliant with meds, attends group & is social with peer on unit. Pt denies PDW, SI, HI & AVH.

## 2018-08-04 NOTE — PROGRESS NOTES
Pt attended group this evening, med compliant, in her room most of the evening, rates Depression 5 & anxiety 5, denies SI/HI/AVH. C/o's muscle spasms during the day. Encouraged frequent ambulation & to try & drink 8 glasses of water every day. No s/s of acute distress. Will continue to monitor.     Electronically signed by Ayad Call RN on 8/4/2018 at 12:06 AM

## 2018-08-04 NOTE — PLAN OF CARE
Problem: Altered Mood, Depressive Behavior:  Goal: Able to verbalize acceptance of life and situations over which he or she has no control  Able to verbalize acceptance of life and situations over which he or she has no control   Outcome: Ongoing                                                                      Group Therapy Note    Date: 8/4/2018  Start Time: 1420  End Time:  1450  Number of Participants: 8    Type of Group: Cognitive Skills    Wellness Binder Information  Module Name:  Stress  Session Number:3. Patient's Goal: Preventing Stress    Notes: Pt discussed their largest source of stress recently and symptoms they experience when highly stressed. Pt acknowledged that having a daily routine and exercising can help to prevent future stress. Status After Intervention:  Improved    Participation Level:  Active Listener and Interactive    Participation Quality: Appropriate      Speech:  normal      Thought Process/Content: Logical      Affective Functioning: Congruent      Mood: depressed      Level of consciousness:  Alert, Oriented x4 and Attentive      Response to Learning: Able to verbalize current knowledge/experience and Progressing to goal      Endings: None Reported    Modes of Intervention: Support      Discipline Responsible: Psychoeducational Specialist      Signature:  Juve Valencia

## 2018-08-04 NOTE — PROGRESS NOTES
Progress Note  Tamar Ceron  8/3/2018 9:26 PM  Subjective:   Admit Date:   7/31/2018      CC/ADMIT DX:       Interval History:   Reviewed overnight events and nursing notes. She has had no HA or dizziness. No CP or SOA. I have reviewed all labs/diagnostics from the last 24hrs. ROS:   I have done a 10 point ROS and all are negative, except what is mentioned in the HPI. DIET GENERAL;    Medications:      vitamin D  50,000 Units Oral Weekly    cetirizine  5 mg Oral Daily    sertraline  150 mg Oral Daily    gabapentin  800 mg Oral TID           Objective:   Vitals: BP (!) 146/77   Pulse 80   Temp 98.2 °F (36.8 °C) (Temporal)   Resp 18   Ht 5' 4\" (1.626 m)   Wt 269 lb 9.6 oz (122.3 kg)   LMP 07/15/2018   SpO2 100%   BMI 46.28 kg/m²  No intake or output data in the 24 hours ending 08/03/18 2126  General appearance: alert and cooperative with exam  Lungs: clear to auscultation bilaterally  Heart: RRR  Abdomen: soft, non-tender; bowel sounds normal; no masses,  no organomegaly  Extremities: extremities normal, atraumatic, no cyanosis or edema  Neurologic:  No obvious focal neurologic deficits. Assessment and Plan:   Depression  Vit D Def  Elevated BP  Plan:  1. Continue present medication(s)   2. Monitor BP closely  3. Follow with Psych      Discharge planning:   her home     Reviewed treatment plans with the patient and/or family.              Electronically signed by Shanta Riggins MD on 8/3/2018 at 9:26 PM

## 2018-08-04 NOTE — PLAN OF CARE
Problem: Altered Mood, Depressive Behavior:  Goal: Able to verbalize and/or display a decrease in depressive symptoms  Able to verbalize and/or display a decrease in depressive symptoms   Outcome: Ongoing                                                                      Group Therapy Note    Date: 8/4/2018  Start Time: 1450  End Time:  1520  Number of Participants: 8    Type of Group: Recovery    Wellness Binder Information  Module Name:  Emotional Wellness  Session Number:  5. Patient's Goal: Obstacles to Emotional Wellness    Notes: Pt acknowledged that avoiding negative self-talk, living one day at a time, and learning from your past experiences are a few great ways to cope with the stress, changes, pain, etc in your life. Status After Intervention:  Improved    Participation Level:  Active Listener and Interactive    Participation Quality: Appropriate and Attentive      Speech:  normal      Thought Process/Content: Logical      Affective Functioning: Congruent      Mood: depressed      Level of consciousness:  Alert, Oriented x4 and Attentive      Response to Learning: Able to verbalize/acknowledge new learning and Progressing to goal      Endings: None Reported    Modes of Intervention: Support and Exploration      Discipline Responsible: Psychoeducational Specialist      Signature:  Mak Gaxiola

## 2018-08-05 PROCEDURE — 6370000000 HC RX 637 (ALT 250 FOR IP): Performed by: PSYCHIATRY & NEUROLOGY

## 2018-08-05 PROCEDURE — 1240000000 HC EMOTIONAL WELLNESS R&B

## 2018-08-05 PROCEDURE — 6370000000 HC RX 637 (ALT 250 FOR IP): Performed by: FAMILY MEDICINE

## 2018-08-05 RX ADMIN — SERTRALINE HYDROCHLORIDE 150 MG: 100 TABLET ORAL at 09:27

## 2018-08-05 RX ADMIN — TIZANIDINE 4 MG: 4 TABLET ORAL at 01:45

## 2018-08-05 RX ADMIN — TIZANIDINE 4 MG: 4 TABLET ORAL at 09:47

## 2018-08-05 RX ADMIN — GABAPENTIN 800 MG: 400 CAPSULE ORAL at 14:57

## 2018-08-05 RX ADMIN — GABAPENTIN 800 MG: 400 CAPSULE ORAL at 09:27

## 2018-08-05 RX ADMIN — TIZANIDINE 4 MG: 4 TABLET ORAL at 17:50

## 2018-08-05 RX ADMIN — CETIRIZINE HYDROCHLORIDE 5 MG: 10 TABLET ORAL at 09:27

## 2018-08-05 RX ADMIN — GABAPENTIN 800 MG: 400 CAPSULE ORAL at 21:05

## 2018-08-05 ASSESSMENT — PAIN SCALES - GENERAL: PAINLEVEL_OUTOF10: 3

## 2018-08-05 NOTE — PROGRESS NOTES
Group Therapy Note    Date: 8/4/2018  Start Time: 2015  End Time:  2030  Number of Participants: 10    Type of Group: Wrap-Up    Wellness Binder Information  Module Name:    Session Number:      Patient's Goal:     Notes:  Filled out wrap up sheet    Status After Intervention:      Participation Level:     Participation Quality:       Speech:         Thought Process/Content:       Affective Functioning:       Mood:       Level of consciousness:        Response to Learning:       Endings:     Modes of Intervention:       Discipline Responsible: [address] [place] [address]      Signature:  Trish Lopez

## 2018-08-05 NOTE — PROGRESS NOTES
Group Therapy Note    Start Time: 900  End Time:  525  Number of Participants: 11    Type of Group: Community Meeting       Patient's Goal:  \"anxiety and depression\"      Notes:      Participation Level:  Active Listener       Participation Quality: Appropriate      Thought Process/Content: Logical      Affective Functioning: Congruent      Mood: calm      Level of consciousness:  Alert      Modes of Intervention: Support      Discipline Responsible: Behavioral Health Tech II      Signature:  Melo Lugo

## 2018-08-05 NOTE — PLAN OF CARE
Problem: Altered Mood, Depressive Behavior:  Goal: Able to verbalize and/or display a decrease in depressive symptoms  Able to verbalize and/or display a decrease in depressive symptoms   Outcome: Ongoing                                                                      Group Therapy Note    Date: 8/5/2018  Start Time: 1115  End Time:  1200  Number of Participants: 12    Type of Group: Recovery    Wellness Binder Information  Module Name:  Emotional Wellness  Session Number:  4. Patient's Goal: Self-Esteem    Notes: Pt acknowledged that being true to yourself and your values, and respecting yourself and others are great ways to enhance your self-esteem. Status After Intervention:  Improved    Participation Level:  Active Listener and Interactive    Participation Quality: Appropriate and Attentive      Speech:  normal      Thought Process/Content: Logical      Affective Functioning: Congruent      Mood: euthymic      Level of consciousness:  Alert, Oriented x4 and Attentive      Response to Learning: Progressing to goal      Endings: None Reported    Modes of Intervention: Support      Discipline Responsible: Psychoeducational Specialist      Signature:  Jamia Dickey

## 2018-08-05 NOTE — PROGRESS NOTES
NURSING PROGRESS NOTE:     DATA: Patient interview      ACTION: Continuous 15 minute monitoring of patient; interview and observation of patient; medications given as ordered. Suicide Precautions in place.     RESPONSE: patient in bed at this time states that she has megan tired all day so she has been in bed most of the day but that she is feeling better and having no thoughts of hurting herself. Patient is med compliant and did attend groups today. No obvious s/s of distress voiced or noted .  Will continue to monitor.         Depression: 2  Anxiety:5  [id number] [place] [address] [place]

## 2018-08-06 VITALS
OXYGEN SATURATION: 100 % | RESPIRATION RATE: 18 BRPM | HEIGHT: 64 IN | WEIGHT: 269.6 LBS | HEART RATE: 78 BPM | BODY MASS INDEX: 46.03 KG/M2 | TEMPERATURE: 98.5 F | DIASTOLIC BLOOD PRESSURE: 79 MMHG | SYSTOLIC BLOOD PRESSURE: 154 MMHG

## 2018-08-06 PROBLEM — F32.1 MODERATE MAJOR DEPRESSION (HCC): Status: ACTIVE | Noted: 2018-08-06

## 2018-08-06 PROBLEM — F32.1 MODERATE MAJOR DEPRESSION (HCC): Status: RESOLVED | Noted: 2018-08-06 | Resolved: 2018-08-06

## 2018-08-06 PROCEDURE — 6370000000 HC RX 637 (ALT 250 FOR IP): Performed by: PSYCHIATRY & NEUROLOGY

## 2018-08-06 PROCEDURE — 99238 HOSP IP/OBS DSCHRG MGMT 30/<: CPT | Performed by: NURSE PRACTITIONER

## 2018-08-06 PROCEDURE — 6370000000 HC RX 637 (ALT 250 FOR IP): Performed by: FAMILY MEDICINE

## 2018-08-06 PROCEDURE — 5130000000 HC BRIDGE APPOINTMENT

## 2018-08-06 RX ORDER — ERGOCALCIFEROL (VITAMIN D2) 1250 MCG
50000 CAPSULE ORAL WEEKLY
Qty: 12 CAPSULE | Refills: 0 | Status: SHIPPED | OUTPATIENT
Start: 2018-08-06 | End: 2018-10-26

## 2018-08-06 RX ADMIN — CETIRIZINE HYDROCHLORIDE 5 MG: 10 TABLET ORAL at 07:59

## 2018-08-06 RX ADMIN — TIZANIDINE 4 MG: 4 TABLET ORAL at 01:52

## 2018-08-06 RX ADMIN — GABAPENTIN 800 MG: 400 CAPSULE ORAL at 07:59

## 2018-08-06 RX ADMIN — TIZANIDINE 4 MG: 4 TABLET ORAL at 10:01

## 2018-08-06 RX ADMIN — GABAPENTIN 800 MG: 400 CAPSULE ORAL at 13:43

## 2018-08-06 RX ADMIN — SERTRALINE HYDROCHLORIDE 150 MG: 100 TABLET ORAL at 07:59

## 2018-08-06 NOTE — PROGRESS NOTES
NURSING PROGRESS NOTE:     DATA: Patient interview      ACTION: Continuous 15 minute monitoring of patient; interview and observation of patient; medications given as ordered. Suicide Precautions in place.     RESPONSE: pt in bed at time of med pass. Pt reports poor sleep but has been in bed all evening.  Pt is not social. Pt is eating well and med compliant.         Depression:2  Anxiety:5  London Shook RN

## 2018-08-06 NOTE — DISCHARGE SUMMARY
Discharge Summary     Patient ID:  Leandra Lindsey  [id number]  68 y.o.  1990    Admit date: 7/31/2018  Discharge date: 8/6/2018    Admitting Physician: Macey Joyner MD   Attending Physician: Macey Joyner MD  Discharge Physician: Cj Jefferson       Discharge Diagnoses: Moderate major depression (HCC) [F32.1]    Admission Condition: fair    Discharged Condition: good    Indication for Admission: suicidal ideation    HPI- PER DR. MEDELLIN [facility] EVAL  The patient is a 71-year-old never been    female with past medical history significant for Chiari I  malformation and a chronic pain and past psychiatric history is significant  for family history of depression and suicide attempt, was brought in to  emergency room by her sister who was concerned about her depression and  suicidal ideation. Per the patient, she has a long history of depression  started at age 9. She still does not understand why she has had  depression, and her depression come and goes. In the past several weeks,  she felt more depressed and become suicidal in the last couple of weeks. In the context, no job, no relationship, and has to live with her mother  and chronic neck pain, right sharp pain from her chronic neurological  condition. The patient feels sad a lot of time lately, had crying episode  and is sleeping more 10 to 12 hours a day, still feels tired and poor  appetite and lost about 30 pounds in the past 12 months, and anger at  herself, feeling empty, had very poor conversation, feels helpless,  hopeless, worthless, and does not like herself. She became more anxious,  socially isolated, suicidal with a plan by hanging herself. The patient  also used a belt and bought a buckle to hit herself. She feels empty and  angry at herself. The patient had a feeling of doom, life is going to  crumble and fall apart, feels if she goes home, she might harm herself. She  thinks about suicide a lot lately.  Her depression affects spinal cord ([facility] [address] Chronic pain syndrome         STOP taking these medications       busPIRone (BUSPAR) 5 MG tablet Comments:   Reason for Stopping:         amitriptyline (ELAVIL) 25 MG tablet Comments:   Reason for Stopping:         methocarbamol (ROBAXIN) 500 MG tablet Comments:   Reason for Stopping:             Activity: activity as tolerated  Diet: regular diet  Wound Care: none needed    Follow-up with   PCP in 2 weeks.     [facility] on 8/16/18 at 8 am and 8/20/18 and [facility] in one week    Time worked: Less than 30 minutes    Participation:good    Electronically signed by SALVADOR Post on 8/6/2018 at 1:06 PM

## 2018-08-06 NOTE — PROGRESS NOTES
Group Therapy Note    Date: 8/6/2018  Start Time: 1000  End Time:   1100  Number of Participants: 14    Type of Group: [facility] EDUCATION    Wellness Binder Information  Module Name:  COPING STRATEGIES  Session Number:     Patient's Goal:  PATIENT'S RESPONSE WHEN FACED WITH STRESSFUL SITUATIONS    Notes:  PATIENT ENJOYED AND PARTICIPATED     Status After Intervention:  Improved    Participation Level:  Active Listener and Interactive    Participation Quality: Appropriate, Attentive and Sharing      Speech:  normal      Thought Process/Content: Logical      Affective Functioning: Congruent      Mood: euthymic      Level of consciousness:  Alert, Oriented x4 and Attentive      Response to Learning: Able to verbalize/acknowledge new learning, Able to retain information, Capable of insight and Progressing to goal      Endings: None Reported    Modes of Intervention: Education      Discipline Responsible: Registered Nurse      Signature:  Gildardo Moon RN

## 2022-12-15 ENCOUNTER — TELEPHONE (OUTPATIENT)
Dept: NEUROSURGERY | Age: 32
End: 2022-12-15

## 2022-12-15 NOTE — TELEPHONE ENCOUNTER
1st attempt to contact patient.  I left a voicemail instructing patient to call back at [phone] to schedule their new patient appointment     neck pain and myosascial pain

## 2022-12-27 NOTE — TELEPHONE ENCOUNTER
[facility] New Patient Questionnaire    Diagnosis/Reason for Referral?    neck pain and myosascial pain    2. Who is completing questionnaire? Patient  Caregiver Family      3. Has the patient had any previous spinal/brain surgeries? YES       A. If yes, what is the name of the facility in which the surgery was performed? [facility]     B. Procedure/Surgery performed? BRAIN AND NECK     C. Who was the surgeon? NOWDA     D. When was the surgery? MARCH 2018       E. Did the patient improve after the surgery? YES      4. Is this a second opinion? If yes, Dr. Deborah Mendoza would like to review patient first before making the appointment. NO     5. Have MRI Images been obtain within the last year? Yes  No      XR  CT     If yes, where was the imaging performed? [facility]   If yes, what part of the body? Lumbar  Cervical  Thoracic  Brain     If yes, when was it obtained? MARCH 2022    Note: if the scan was performed at a facility other than [facility], the disc will need to be brought to the appointment or we need to reach out to obtain the disc. A. Was the patient instructed to provide the disc? Yes   No      8. Has the patient had a NCV/EMG within the last year? Yes  No     If yes, where was it performed and date? MM/YY  Location:      9. Has the patient been to Physical Therapy? Yes  No     If yes, what location, how long attended, and last visit? Location:        Therapy Lasted:    Date of Last Visit:      10. Has the patient been to Pain Management? Yes  No     If yes, what location and last visit     Location: [facility]   Is it helping?   YES

## 2023-01-23 ENCOUNTER — OFFICE VISIT (OUTPATIENT)
Dept: NEUROSURGERY | Age: 33
End: 2023-01-23
Payer: MEDICAID

## 2023-01-23 VITALS
RESPIRATION RATE: 18 BRPM | DIASTOLIC BLOOD PRESSURE: 75 MMHG | HEIGHT: 64 IN | BODY MASS INDEX: 50.02 KG/M2 | HEART RATE: 77 BPM | SYSTOLIC BLOOD PRESSURE: 122 MMHG | WEIGHT: 293 LBS

## 2023-01-23 DIAGNOSIS — G89.4 CHRONIC PAIN SYNDROME: ICD-10-CM

## 2023-01-23 DIAGNOSIS — G95.0 SYRINX OF SPINAL CORD (HCC): ICD-10-CM

## 2023-01-23 DIAGNOSIS — G93.5 CHIARI I MALFORMATION (HCC): Primary | ICD-10-CM

## 2023-01-23 DIAGNOSIS — M54.2 CHRONIC NECK PAIN: ICD-10-CM

## 2023-01-23 DIAGNOSIS — G89.29 CHRONIC NECK PAIN: ICD-10-CM

## 2023-01-23 PROCEDURE — 99204 OFFICE O/P NEW MOD 45 MIN: CPT | Performed by: NEUROLOGICAL SURGERY

## 2023-01-23 RX ORDER — LAMOTRIGINE 100 MG/1
TABLET ORAL
COMMUNITY

## 2023-01-23 RX ORDER — TIZANIDINE 4 MG/1
TABLET ORAL
COMMUNITY
Start: 2023-01-16

## 2023-01-23 RX ORDER — BUPROPION HYDROCHLORIDE 300 MG/1
TABLET ORAL
COMMUNITY

## 2023-01-23 RX ORDER — HYDROCODONE BITARTRATE AND ACETAMINOPHEN 5; 325 MG/1; MG/1
TABLET ORAL
COMMUNITY

## 2023-01-23 RX ORDER — CLONAZEPAM 2 MG/1
TABLET ORAL
COMMUNITY
Start: 2023-01-16

## 2023-01-23 RX ORDER — PREGABALIN 300 MG/1
CAPSULE ORAL
COMMUNITY

## 2023-01-23 RX ORDER — LURASIDONE HYDROCHLORIDE 20 MG/1
TABLET, FILM COATED ORAL
COMMUNITY
Start: 2022-11-16

## 2023-01-23 RX ORDER — FLUOXETINE 10 MG/1
CAPSULE ORAL
COMMUNITY

## 2023-01-23 ASSESSMENT — ENCOUNTER SYMPTOMS
GASTROINTESTINAL NEGATIVE: 1
RESPIRATORY NEGATIVE: 1
EYES NEGATIVE: 1

## 2023-01-23 NOTE — PROGRESS NOTES
Review of Systems   Constitutional: Negative. HENT: Negative. Eyes: Negative. Respiratory: Negative. Cardiovascular: Negative. Gastrointestinal: Negative. Genitourinary: Negative. Musculoskeletal:  Positive for joint pain, myalgias and neck pain. Skin: Negative. Neurological:  Positive for weakness. Endo/Heme/Allergies: Negative. Psychiatric/Behavioral: Negative.
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
